# Patient Record
Sex: MALE | Race: WHITE | NOT HISPANIC OR LATINO | URBAN - METROPOLITAN AREA
[De-identification: names, ages, dates, MRNs, and addresses within clinical notes are randomized per-mention and may not be internally consistent; named-entity substitution may affect disease eponyms.]

---

## 2022-01-16 ENCOUNTER — OUTPATIENT (OUTPATIENT)
Dept: OUTPATIENT SERVICES | Facility: HOSPITAL | Age: 42
LOS: 1 days | End: 2022-01-16

## 2022-01-16 DIAGNOSIS — Z20.822 CONTACT WITH AND (SUSPECTED) EXPOSURE TO COVID-19: ICD-10-CM

## 2022-01-16 LAB — SARS-COV-2 RNA SPEC QL NAA+PROBE: SIGNIFICANT CHANGE UP

## 2022-04-25 PROBLEM — Z00.00 ENCOUNTER FOR PREVENTIVE HEALTH EXAMINATION: Status: ACTIVE | Noted: 2022-04-25

## 2022-04-26 ENCOUNTER — APPOINTMENT (OUTPATIENT)
Dept: SPORTS MEDICINE | Facility: CLINIC | Age: 42
End: 2022-04-26
Payer: COMMERCIAL

## 2022-04-26 DIAGNOSIS — M25.552 PAIN IN LEFT HIP: ICD-10-CM

## 2022-04-26 DIAGNOSIS — Z78.9 OTHER SPECIFIED HEALTH STATUS: ICD-10-CM

## 2022-04-26 PROCEDURE — 99204 OFFICE O/P NEW MOD 45 MIN: CPT

## 2022-04-26 RX ORDER — MELOXICAM 15 MG/1
15 TABLET ORAL DAILY
Qty: 14 | Refills: 1 | Status: ACTIVE | COMMUNITY
Start: 2022-04-26 | End: 1900-01-01

## 2022-04-26 RX ORDER — OMEPRAZOLE 40 MG/1
CAPSULE, DELAYED RELEASE ORAL
Refills: 0 | Status: ACTIVE | COMMUNITY

## 2022-04-26 NOTE — ASSESSMENT
[FreeTextEntry1] : 40 yo M pw L lateral hip pain consistent with greater trochanter bursitis. BSUS showed + edema over greater trochanter consistent with bursitis. Given improvement with Advil, encouraged patient to transition to meloxicam once a day for 2 weeks for pain and inflammation. Pt given prescription for PT to began when pain resolves. Pt instructed to avoid aggravating activities such as squat, hip thrusts, lunges. Pt to follow-up as needed in 4-8 weeks. If pain continues will consider steroid injection.

## 2022-04-26 NOTE — DISCUSSION/SUMMARY
[de-identified] : Attending note. Impression: #1 left hip pain, #2 left trochanteric bursitis. She was provided with a prescription for meloxicam as well as a prescription for physical therapy. He was advised to continue icing of the area of post-activity and encouraged to use a roller as well. He will return to the office p.r.n.

## 2022-04-26 NOTE — HISTORY OF PRESENT ILLNESS
[de-identified] : 42 yo M pw L lateral hip pain x 5 days. Pt states his symptoms began Saturday. He endorses lateral hip pain that radiates down his thigh. He states his pain is worse with lying on the side, walking upstairs or pivoting. He denies any injuries to the area. He states he has been taking 600mg over advil twice a day and the pain is improving. Denies fever, chills, back pain, numbness, tingling, weakness, skin changes. \par      Attending note. 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 Patient is complaining of intermittent left hip pain for the last 1-2 months which became worse this weekend. Pain is sharp exacerbated by going up stairs and when weight-bearing. He denies any acute trauma or injury. The pain occasionally radiates distally on the lateral thigh. He denies any lower back pain, numbness or paresthesia. He reports improvement with ibuprofen and with ice massage. He denies any prior episodes.

## 2022-04-26 NOTE — PHYSICAL EXAM
[Normal] : Gait: normal [de-identified] : General: well-appearing, NAD\par Hip: No acute deformity, erythema, swelling,+ TTP over L greater trochanter, Full ROM in flexion, extension, abduction, adduction, internal rotation, external rotation, 5/5 strength with flexion, extension, abduction, adduction, internal rotation, external rotation, + lateral hip pain with FADIR, negative CLAUDETTE \par   Attending note. Agree with the above. [de-identified] : Attending note. Point of care ultrasound was performed in the office today of the left hip which shows a small left trochanteric bursal effusion which is tender to palpation and pressure with the ultrasound probe.

## 2022-04-26 NOTE — REVIEW OF SYSTEMS
[Joint Pain] : no joint pain [Joint Stiffness] : no joint stiffness [Joint Swelling] : no joint swelling [Fever] : no fever [Chills] : no chills [Negative] : Heme/Lymph [de-identified] : No numbness or tingling

## 2022-11-22 ENCOUNTER — APPOINTMENT (OUTPATIENT)
Dept: ORTHOPEDIC SURGERY | Facility: CLINIC | Age: 42
End: 2022-11-22

## 2022-11-28 ENCOUNTER — APPOINTMENT (OUTPATIENT)
Dept: ORTHOPEDIC SURGERY | Facility: CLINIC | Age: 42
End: 2022-11-28

## 2023-10-16 ENCOUNTER — APPOINTMENT (OUTPATIENT)
Dept: ORTHOPEDIC SURGERY | Facility: CLINIC | Age: 43
End: 2023-10-16

## 2023-10-24 ENCOUNTER — NON-APPOINTMENT (OUTPATIENT)
Age: 43
End: 2023-10-24

## 2023-10-24 ENCOUNTER — APPOINTMENT (OUTPATIENT)
Dept: ORTHOPEDIC SURGERY | Facility: CLINIC | Age: 43
End: 2023-10-24
Payer: COMMERCIAL

## 2023-10-24 VITALS — WEIGHT: 200 LBS | BODY MASS INDEX: 26.51 KG/M2 | HEIGHT: 73 IN

## 2023-10-24 DIAGNOSIS — M77.01 MEDIAL EPICONDYLITIS, RIGHT ELBOW: ICD-10-CM

## 2023-10-24 PROCEDURE — 99213 OFFICE O/P EST LOW 20 MIN: CPT

## 2023-10-24 RX ORDER — DICLOFENAC SODIUM 1% 10 MG/G
1 GEL TOPICAL
Qty: 1 | Refills: 0 | Status: ACTIVE | COMMUNITY
Start: 2023-10-24 | End: 1900-01-01